# Patient Record
Sex: FEMALE | Race: WHITE | Employment: OTHER | URBAN - METROPOLITAN AREA
[De-identification: names, ages, dates, MRNs, and addresses within clinical notes are randomized per-mention and may not be internally consistent; named-entity substitution may affect disease eponyms.]

---

## 2020-12-17 ENCOUNTER — HOSPITAL ENCOUNTER (EMERGENCY)
Facility: CLINIC | Age: 65
Discharge: HOME | End: 2020-12-17
Attending: EMERGENCY MEDICINE
Payer: COMMERCIAL

## 2020-12-17 ENCOUNTER — APPOINTMENT (OUTPATIENT)
Dept: GENERAL RADIOLOGY | Facility: CLINIC | Age: 65
End: 2020-12-17
Payer: COMMERCIAL

## 2020-12-17 VITALS
OXYGEN SATURATION: 98 % | DIASTOLIC BLOOD PRESSURE: 98 MMHG | WEIGHT: 200 LBS | BODY MASS INDEX: 35.44 KG/M2 | SYSTOLIC BLOOD PRESSURE: 164 MMHG | HEIGHT: 63 IN | TEMPERATURE: 98.5 F | HEART RATE: 64 BPM | RESPIRATION RATE: 20 BRPM

## 2020-12-17 LAB
ABSOLUTE EOS #: 0.1 K/UL (ref 0–0.4)
ABSOLUTE IMMATURE GRANULOCYTE: ABNORMAL K/UL (ref 0–0.3)
ABSOLUTE LYMPH #: 1.4 K/UL (ref 1–4.8)
ABSOLUTE MONO #: 0.6 K/UL (ref 0.1–1.2)
ANION GAP SERPL CALCULATED.3IONS-SCNC: 9 MMOL/L (ref 9–17)
BASOPHILS # BLD: 1 % (ref 0–2)
BASOPHILS ABSOLUTE: 0 K/UL (ref 0–0.2)
BNP INTERPRETATION: NORMAL
BUN BLDV-MCNC: 14 MG/DL (ref 8–23)
BUN/CREAT BLD: ABNORMAL (ref 9–20)
CALCIUM SERPL-MCNC: 9 MG/DL (ref 8.6–10.4)
CHLORIDE BLD-SCNC: 104 MMOL/L (ref 98–107)
CO2: 25 MMOL/L (ref 20–31)
CREAT SERPL-MCNC: 0.7 MG/DL (ref 0.5–0.9)
D-DIMER QUANTITATIVE: 0.31 MG/L FEU
DIFFERENTIAL TYPE: ABNORMAL
EOSINOPHILS RELATIVE PERCENT: 1 % (ref 1–4)
GFR AFRICAN AMERICAN: >60 ML/MIN
GFR NON-AFRICAN AMERICAN: >60 ML/MIN
GFR SERPL CREATININE-BSD FRML MDRD: ABNORMAL ML/MIN/{1.73_M2}
GFR SERPL CREATININE-BSD FRML MDRD: ABNORMAL ML/MIN/{1.73_M2}
GLUCOSE BLD-MCNC: 102 MG/DL (ref 70–99)
HCT VFR BLD CALC: 43.7 % (ref 36–46)
HEMOGLOBIN: 14.4 G/DL (ref 12–16)
IMMATURE GRANULOCYTES: ABNORMAL %
INR BLD: 1
LYMPHOCYTES # BLD: 26 % (ref 24–44)
MCH RBC QN AUTO: 30 PG (ref 26–34)
MCHC RBC AUTO-ENTMCNC: 33 G/DL (ref 31–37)
MCV RBC AUTO: 91 FL (ref 80–100)
MONOCYTES # BLD: 12 % (ref 2–11)
NRBC AUTOMATED: ABNORMAL PER 100 WBC
PARTIAL THROMBOPLASTIN TIME: 23.9 SEC (ref 21.3–31.3)
PDW BLD-RTO: 13.2 % (ref 12.5–15.4)
PLATELET # BLD: 185 K/UL (ref 140–450)
PLATELET ESTIMATE: ABNORMAL
PMV BLD AUTO: 9.2 FL (ref 6–12)
POTASSIUM SERPL-SCNC: 4.2 MMOL/L (ref 3.7–5.3)
PRO-BNP: 156 PG/ML
PROTHROMBIN TIME: 9.9 SEC (ref 9.4–12.6)
RBC # BLD: 4.8 M/UL (ref 4–5.2)
RBC # BLD: ABNORMAL 10*6/UL
SEG NEUTROPHILS: 60 % (ref 36–66)
SEGMENTED NEUTROPHILS ABSOLUTE COUNT: 3.3 K/UL (ref 1.8–7.7)
SODIUM BLD-SCNC: 138 MMOL/L (ref 135–144)
TROPONIN INTERP: NORMAL
TROPONIN T: NORMAL NG/ML
TROPONIN, HIGH SENSITIVITY: <6 NG/L (ref 0–14)
WBC # BLD: 5.4 K/UL (ref 3.5–11)
WBC # BLD: ABNORMAL 10*3/UL

## 2020-12-17 PROCEDURE — 93005 ELECTROCARDIOGRAM TRACING: CPT | Performed by: EMERGENCY MEDICINE

## 2020-12-17 PROCEDURE — 85610 PROTHROMBIN TIME: CPT

## 2020-12-17 PROCEDURE — 36415 COLL VENOUS BLD VENIPUNCTURE: CPT

## 2020-12-17 PROCEDURE — 80048 BASIC METABOLIC PNL TOTAL CA: CPT

## 2020-12-17 PROCEDURE — 85730 THROMBOPLASTIN TIME PARTIAL: CPT

## 2020-12-17 PROCEDURE — 71045 X-RAY EXAM CHEST 1 VIEW: CPT

## 2020-12-17 PROCEDURE — 85025 COMPLETE CBC W/AUTO DIFF WBC: CPT

## 2020-12-17 PROCEDURE — 99284 EMERGENCY DEPT VISIT MOD MDM: CPT

## 2020-12-17 PROCEDURE — 84484 ASSAY OF TROPONIN QUANT: CPT

## 2020-12-17 PROCEDURE — 85379 FIBRIN DEGRADATION QUANT: CPT

## 2020-12-17 PROCEDURE — 83880 ASSAY OF NATRIURETIC PEPTIDE: CPT

## 2020-12-17 RX ORDER — AMLODIPINE BESYLATE 5 MG/1
5 TABLET ORAL DAILY
COMMUNITY

## 2020-12-17 RX ORDER — CARVEDILOL 6.25 MG/1
6.25 TABLET ORAL 2 TIMES DAILY WITH MEALS
COMMUNITY

## 2020-12-17 ASSESSMENT — PAIN SCALES - GENERAL
PAINLEVEL_OUTOF10: 3
PAINLEVEL_OUTOF10: 8

## 2020-12-17 ASSESSMENT — PAIN DESCRIPTION - FREQUENCY: FREQUENCY: CONTINUOUS

## 2020-12-17 ASSESSMENT — PAIN DESCRIPTION - PROGRESSION: CLINICAL_PROGRESSION: NOT CHANGED

## 2020-12-17 ASSESSMENT — PAIN DESCRIPTION - PAIN TYPE: TYPE: ACUTE PAIN

## 2020-12-17 ASSESSMENT — PAIN DESCRIPTION - DESCRIPTORS: DESCRIPTORS: PRESSURE

## 2020-12-17 ASSESSMENT — PAIN DESCRIPTION - ONSET: ONSET: GRADUAL

## 2020-12-17 ASSESSMENT — PAIN DESCRIPTION - LOCATION: LOCATION: CHEST

## 2020-12-17 NOTE — ED NOTES
Pt states friend tested positive for covid Tuesday and pt has similar symptoms, had covid test done and negative, called pmd and is planning on having repeat covid test tomorrow. Pt c/o back pain bodyaches, hurts to breath, chest pain.  resp nonlabored,pt able to speak full sent w/o diff     [address], RN  12/17/20 [id number]

## 2020-12-17 NOTE — ED PROVIDER NOTES
[address] [facility]      Pt Name: Shavonne Herrmann  MRN: [id number]  [place] 1955  Date of evaluation: 12/17/2020      CHIEF COMPLAINT       Chief Complaint   Patient presents with    Chest Pain     Pressure, 8/10. Tested for Covid on 12/15 and was negative. Has had a cough and chest pain. Taking Albuterol and Tessalon Pearls for two days.  Back Pain    Shortness of Breath    Cough         HISTORY OF PRESENT ILLNESS      The patient presents with chest discomfort for the past 3 days. She got a test for Covid 2 days ago and it was negative. The patient says she is having tightness that is in her front of her chest and toward her back. She has been using albuterol and Tessalon Perles for the past couple days. She reports a cough that is occasionally productive. She has not had a fever. She denies nausea, vomiting, or diarrhea. Has not noted leg pain or swelling. She does not have a history of blood clots. Nothing makes her symptoms better or worse otherwise. REVIEW OF SYSTEMS       All systems reviewed and negative unless noted in HPI. The patient denies fever or constitutional symptoms. Denies vision change. Denies any sore throat or rhinorrhea. Denies any neck pain or stiffness. Recent cough with tightness in chest and into back. No nausea,  vomiting or diarrhea. Denies any dysuria. Denies urinary frequency or hematuria. Denies musculoskeletal injury or pain. Denies any weakness, numbness or focal neurologic deficit. Denies any skin rash or edema. No recent psychiatric issues. No easy bruising or bleeding. Denies any polyuria, polydypsia or history of immunocompromise. PAST MEDICAL HISTORY    has a past medical history of Anxiety, CAD (coronary artery disease), Hyperlipidemia, and Hypertension. SURGICAL HISTORY      has no past surgical history on file.     CURRENT MEDICATIONS Previous Medications    AMLODIPINE (NORVASC) 5 MG TABLET    Take 5 mg by mouth daily    CARVEDILOL (COREG) 6.25 MG TABLET    Take 6.25 mg by mouth 2 times daily (with meals)    CITALOPRAM HYDROBROMIDE (CELEXA PO)    Take by mouth    LOSARTAN POTASSIUM PO    Take by mouth    ROSUVASTATIN CALCIUM PO    Take 40 mg by mouth       ALLERGIES     has No Known Allergies. FAMILY HISTORY     has no family status information on file. family history is not on file. SOCIAL HISTORY      reports that she has never smoked. She has never used smokeless tobacco. She reports current alcohol use of about 7.0 standard drinks of alcohol per week. She reports that she does not use drugs. PHYSICAL EXAM     INITIAL VITALS:  height is 5' 3\" (1.6 m) and weight is 90.7 kg (200 lb). Her oral temperature is 98.5 °F (36.9 °C). Her blood pressure is 164/98 (abnormal) and her pulse is 68. Her respiration is 20 and oxygen saturation is 94%. The patient is alert and oriented, in no apparent distress. HEENT is atraumatic. Pupils are PERRL at 4 mm. Mucous membranes moist.    Neck is supple with no lymphadenopathy. No JVD. No meningismus. Heart sounds regular rate and rhythm with no gallops, murmurs, or rubs. Lungs clear, no wheezes, rales or rhonchi. Abdomen: soft, nontender with no pain to palpation. No pulsatile mass. Normal bowel sounds are noted. No rebound or guarding. Musculoskeletal exam shows no evidence of trauma. No pain to palpation in the calves or thighs. Normal distal pulses in all extremities. Skin: no rash or edema. Neurological exam reveals cranial nerves 2 through 12 grossly intact. Patient has equal  and normal deep tendon reflexes. Psychiatric: no hallucinations or suicidal ideation. Lymphatics.:  No lymphadenopathy.          DIFFERENTIAL DIAGNOSIS/ MDM:     URI, pneumonia, COVID-19, ACS, PE    DIAGNOSTIC RESULTS EKG: All EKG's are interpreted by the Emergency Department Physician who either signs or Co-signs this chart in the absence of a cardiologist.    Sinus 65 with no ischemia. Axis 15, , QRS 78, . No old to compare. RADIOLOGY:   I reviewed the radiologist interpretations:  XR CHEST PORTABLE   Final Result   No acute cardiopulmonary abnormality. XR CHEST PORTABLE (Final result)  Result time 12/17/20 15:27:59  Final result by Chloé Hernandez MD (12/17/20 15:27:59)                Impression:    No acute cardiopulmonary abnormality. Narrative:    EXAMINATION:   ONE XRAY VIEW OF THE CHEST     12/17/2020 3:19 pm     COMPARISON:   None. HISTORY:   ORDERING SYSTEM PROVIDED HISTORY: chest pain   TECHNOLOGIST PROVIDED HISTORY:   chest pain   Reason for Exam: Pt c/o cough and chest tightness that radiates to back   Acuity: Acute   Type of Exam: Initial     FINDINGS:   The lungs are clear.  The cardiac and mediastinal contours are normal.  There   is no pleural effusion or pneumothorax.  No acute osseous abnormality is   identified.                     LABS:  Results for orders placed or performed during the hospital encounter of 12/17/20   CBC Auto Differential   Result Value Ref Range    WBC 5.4 3.5 - 11.0 k/uL    RBC 4.80 4.0 - 5.2 m/uL    Hemoglobin 14.4 12.0 - 16.0 g/dL    Hematocrit 43.7 36 - 46 %    MCV 91.0 80 - 100 fL    MCH 30.0 26 - 34 pg    MCHC 33.0 31 - 37 g/dL    RDW 13.2 12.5 - 15.4 %    Platelets 031 405 - 219 k/uL    MPV 9.2 6.0 - 12.0 fL    NRBC Automated NOT REPORTED per 100 WBC    Differential Type NOT REPORTED     Seg Neutrophils 60 36 - 66 %    Lymphocytes 26 24 - 44 %    Monocytes 12 (H) 2 - 11 %    Eosinophils % 1 1 - 4 %    Basophils 1 0 - 2 %    Immature Granulocytes NOT REPORTED 0 %    Segs Absolute 3.30 1.8 - 7.7 k/uL    Absolute Lymph # 1.40 1.0 - 4.8 k/uL    Absolute Mono # 0.60 0.1 - 1.2 k/uL    Absolute Eos # 0.10 0.0 - 0.4 k/uL Basophils Absolute 0.00 0.0 - 0.2 k/uL    Absolute Immature Granulocyte NOT REPORTED 0.00 - 0.30 k/uL    WBC Morphology NOT REPORTED     RBC Morphology NOT REPORTED     Platelet Estimate NOT REPORTED    Basic Metabolic Panel   Result Value Ref Range    Glucose 102 (H) 70 - 99 mg/dL    BUN 14 8 - 23 mg/dL    CREATININE 0.70 0.50 - 0.90 mg/dL    Bun/Cre Ratio NOT REPORTED 9 - 20    Calcium 9.0 8.6 - 10.4 mg/dL    Sodium 138 135 - 144 mmol/L    Potassium 4.2 3.7 - 5.3 mmol/L    Chloride 104 98 - 107 mmol/L    CO2 25 20 - 31 mmol/L    Anion Gap 9 9 - 17 mmol/L    GFR Non-African American >60 >60 mL/min    GFR African American >60 >60 mL/min    GFR Comment          GFR Staging NOT REPORTED    Brain Natriuretic Peptide   Result Value Ref Range    Pro- <300 pg/mL    BNP Interpretation Pro-BNP Reference Range:    Troponin   Result Value Ref Range    Troponin, High Sensitivity <6 0 - 14 ng/L    Troponin T NOT REPORTED <0.03 ng/mL    Troponin Interp NOT REPORTED    D-Dimer, Quantitative   Result Value Ref Range    D-Dimer, Quant 0.31 mg/L FEU   Protime-INR   Result Value Ref Range    Protime 9.9 9.4 - 12.6 sec    INR 1.0    APTT   Result Value Ref Range    PTT 23.9 21.3 - 31.3 sec         EMERGENCY DEPARTMENT COURSE:   Vitals:    Vitals:    12/17/20 1411 12/17/20 1500   BP: (!) 161/91 (!) 164/98   Pulse: 68 68   Resp:  20   Temp: 98.5 °F (36.9 °C)    TempSrc: Oral    SpO2:  94%   Weight: 90.7 kg (200 lb)    Height: 5' 3\" (1.6 m)      -------------------------  BP: (!) 164/98, Temp: 98.5 °F (36.9 °C), Pulse: 68, Resp: 20      Re-evaluation Notes    My index of suspicion for PE or ACS is low, especially given the duration of discomfort. My index of suspicion for dissection is also low. The patient is asked to follow-up with her doctor. Given that she is having some URI symptoms, she should isolate at home. The patient is discharged in good condition. FINAL IMPRESSION      1.  Chest pain, unspecified type DISPOSITION/PLAN   DISPOSITION        Condition on Disposition    good    PATIENT REFERRED TO:  Pavithra Loo MD  [address] [phone]    In 1 week        DISCHARGE MEDICATIONS:  New Prescriptions    No medications on file       (Please note that portions of this note were completed with a voice recognition program.  Efforts were made to edit the dictations but occasionally words are mis-transcribed.)    [place] MD   Attending Emergency Physician       Ann Longoria MD  12/17/20 [id number]

## 2020-12-18 LAB
EKG ATRIAL RATE: 65 BPM
EKG P AXIS: 57 DEGREES
EKG P-R INTERVAL: 176 MS
EKG Q-T INTERVAL: 406 MS
EKG QRS DURATION: 78 MS
EKG QTC CALCULATION (BAZETT): 422 MS
EKG R AXIS: 15 DEGREES
EKG T AXIS: 39 DEGREES
EKG VENTRICULAR RATE: 65 BPM

## 2023-08-01 ENCOUNTER — TELEPHONE (OUTPATIENT)
Dept: SURGERY | Age: 68
End: 2023-08-01

## 2023-08-01 NOTE — TELEPHONE ENCOUNTER
Dr. Kang Needs  Screening Colonoscopy  9-19-23   1PM   [facility]      Sent Pre-Op and Bowel Prep Instructions to patient. Updated Pharmacy Information. Routed Bowel Prep Meds to Dr. Amna Urrutia.

## 2023-09-12 ENCOUNTER — TELEPHONE (OUTPATIENT)
Dept: SURGERY | Age: 68
End: 2023-09-12

## 2023-09-12 RX ORDER — SOD SULF/POT CHLORIDE/MAG SULF 1.479 G
TABLET ORAL
Qty: 24 TABLET | Refills: 0 | Status: SHIPPED | OUTPATIENT
Start: 2023-09-12

## 2023-09-14 ENCOUNTER — TELEPHONE (OUTPATIENT)
Dept: SURGERY | Age: 68
End: 2023-09-14

## 2023-09-14 NOTE — TELEPHONE ENCOUNTER
Patient called and stated that her SuTab prescription was too expensive. [facility] with Copay card.  Called and informed pt of $60 copay and that it would be ready on 9/15/23 after 4pm.

## 2023-09-19 ENCOUNTER — ANESTHESIA EVENT (OUTPATIENT)
Dept: OPERATING ROOM | Age: 68
End: 2023-09-19
Payer: MEDICARE

## 2023-09-19 ENCOUNTER — HOSPITAL ENCOUNTER (OUTPATIENT)
Age: 68
Setting detail: OUTPATIENT SURGERY
Discharge: HOME | End: 2023-09-19
Attending: COLON & RECTAL SURGERY | Admitting: COLON & RECTAL SURGERY
Payer: MEDICARE

## 2023-09-19 ENCOUNTER — ANESTHESIA (OUTPATIENT)
Dept: OPERATING ROOM | Age: 68
End: 2023-09-19
Payer: MEDICARE

## 2023-09-19 VITALS
SYSTOLIC BLOOD PRESSURE: 161 MMHG | TEMPERATURE: 97 F | WEIGHT: 182.8 LBS | HEIGHT: 63 IN | HEART RATE: 79 BPM | BODY MASS INDEX: 32.39 KG/M2 | RESPIRATION RATE: 17 BRPM | OXYGEN SATURATION: 100 % | DIASTOLIC BLOOD PRESSURE: 85 MMHG

## 2023-09-19 DIAGNOSIS — Z12.11 SCREENING FOR COLON CANCER: ICD-10-CM

## 2023-09-19 LAB — GLUCOSE BLD-MCNC: 87 MG/DL (ref 65–105)

## 2023-09-19 PROCEDURE — 3609010300 HC COLONOSCOPY W/BIOPSY SINGLE/MULTIPLE: Performed by: COLON & RECTAL SURGERY

## 2023-09-19 PROCEDURE — 2709999900 HC NON-CHARGEABLE SUPPLY: Performed by: COLON & RECTAL SURGERY

## 2023-09-19 PROCEDURE — 6360000002 HC RX W HCPCS: Performed by: SPECIALIST

## 2023-09-19 PROCEDURE — 82947 ASSAY GLUCOSE BLOOD QUANT: CPT

## 2023-09-19 PROCEDURE — 3700000000 HC ANESTHESIA ATTENDED CARE: Performed by: COLON & RECTAL SURGERY

## 2023-09-19 PROCEDURE — 3700000001 HC ADD 15 MINUTES (ANESTHESIA): Performed by: COLON & RECTAL SURGERY

## 2023-09-19 PROCEDURE — 2500000003 HC RX 250 WO HCPCS: Performed by: SPECIALIST

## 2023-09-19 PROCEDURE — 7100000010 HC PHASE II RECOVERY - FIRST 15 MIN: Performed by: COLON & RECTAL SURGERY

## 2023-09-19 PROCEDURE — 2580000003 HC RX 258

## 2023-09-19 PROCEDURE — 7100000011 HC PHASE II RECOVERY - ADDTL 15 MIN: Performed by: COLON & RECTAL SURGERY

## 2023-09-19 PROCEDURE — 88305 TISSUE EXAM BY PATHOLOGIST: CPT

## 2023-09-19 RX ORDER — SEMAGLUTIDE 0.68 MG/ML
INJECTION, SOLUTION SUBCUTANEOUS
COMMUNITY
Start: 2023-09-07

## 2023-09-19 RX ORDER — ROSUVASTATIN CALCIUM 40 MG/1
40 TABLET, COATED ORAL DAILY
COMMUNITY
Start: 2023-09-07

## 2023-09-19 RX ORDER — SODIUM CHLORIDE 0.9 % (FLUSH) 0.9 %
5-40 SYRINGE (ML) INJECTION EVERY 12 HOURS SCHEDULED
Status: DISCONTINUED | OUTPATIENT
Start: 2023-09-19 | End: 2023-09-19 | Stop reason: HOSPADM

## 2023-09-19 RX ORDER — OMEPRAZOLE 20 MG/1
20 CAPSULE, DELAYED RELEASE ORAL
COMMUNITY
Start: 2023-06-22

## 2023-09-19 RX ORDER — SODIUM CHLORIDE 9 MG/ML
INJECTION, SOLUTION INTRAVENOUS PRN
Status: DISCONTINUED | OUTPATIENT
Start: 2023-09-19 | End: 2023-09-19 | Stop reason: HOSPADM

## 2023-09-19 RX ORDER — LIDOCAINE HYDROCHLORIDE 20 MG/ML
INJECTION, SOLUTION EPIDURAL; INFILTRATION; INTRACAUDAL; PERINEURAL PRN
Status: DISCONTINUED | OUTPATIENT
Start: 2023-09-19 | End: 2023-09-19

## 2023-09-19 RX ORDER — SODIUM CHLORIDE 9 MG/ML
INJECTION, SOLUTION INTRAVENOUS CONTINUOUS
Status: DISCONTINUED | OUTPATIENT
Start: 2023-09-19 | End: 2023-09-19 | Stop reason: HOSPADM

## 2023-09-19 RX ORDER — SODIUM CHLORIDE 9 MG/ML
INJECTION, SOLUTION INTRAVENOUS PRN
Status: CANCELLED | OUTPATIENT
Start: 2023-09-19

## 2023-09-19 RX ORDER — ONDANSETRON 2 MG/ML
4 INJECTION INTRAMUSCULAR; INTRAVENOUS
Status: CANCELLED | OUTPATIENT
Start: 2023-09-19 | End: 2023-09-20

## 2023-09-19 RX ORDER — FENTANYL CITRATE 50 UG/ML
25 INJECTION, SOLUTION INTRAMUSCULAR; INTRAVENOUS EVERY 5 MIN PRN
Status: CANCELLED | OUTPATIENT
Start: 2023-09-19

## 2023-09-19 RX ORDER — SODIUM CHLORIDE 0.9 % (FLUSH) 0.9 %
5-40 SYRINGE (ML) INJECTION PRN
Status: DISCONTINUED | OUTPATIENT
Start: 2023-09-19 | End: 2023-09-19 | Stop reason: HOSPADM

## 2023-09-19 RX ORDER — SODIUM CHLORIDE, SODIUM LACTATE, POTASSIUM CHLORIDE, CALCIUM CHLORIDE 600; 310; 30; 20 MG/100ML; MG/100ML; MG/100ML; MG/100ML
INJECTION, SOLUTION INTRAVENOUS CONTINUOUS
Status: DISCONTINUED | OUTPATIENT
Start: 2023-09-19 | End: 2023-09-19 | Stop reason: HOSPADM

## 2023-09-19 RX ORDER — LORATADINE 10 MG/1
10 TABLET ORAL DAILY
COMMUNITY
Start: 2023-08-17

## 2023-09-19 RX ORDER — LIDOCAINE HYDROCHLORIDE 10 MG/ML
1 INJECTION, SOLUTION EPIDURAL; INFILTRATION; INTRACAUDAL; PERINEURAL
Status: DISCONTINUED | OUTPATIENT
Start: 2023-09-20 | End: 2023-09-19 | Stop reason: HOSPADM

## 2023-09-19 RX ORDER — ESCITALOPRAM OXALATE 10 MG/1
10 TABLET ORAL DAILY
COMMUNITY
Start: 2023-09-07

## 2023-09-19 RX ORDER — SODIUM CHLORIDE 0.9 % (FLUSH) 0.9 %
5-40 SYRINGE (ML) INJECTION EVERY 12 HOURS SCHEDULED
Status: CANCELLED | OUTPATIENT
Start: 2023-09-19

## 2023-09-19 RX ORDER — PROPOFOL 10 MG/ML
INJECTION, EMULSION INTRAVENOUS PRN
Status: DISCONTINUED | OUTPATIENT
Start: 2023-09-19 | End: 2023-09-19 | Stop reason: SDUPTHER

## 2023-09-19 RX ORDER — LIDOCAINE HYDROCHLORIDE 10 MG/ML
INJECTION, SOLUTION EPIDURAL; INFILTRATION; INTRACAUDAL; PERINEURAL PRN
Status: DISCONTINUED | OUTPATIENT
Start: 2023-09-19 | End: 2023-09-19 | Stop reason: SDUPTHER

## 2023-09-19 RX ORDER — HYDROMORPHONE HYDROCHLORIDE 1 MG/ML
0.5 INJECTION, SOLUTION INTRAMUSCULAR; INTRAVENOUS; SUBCUTANEOUS EVERY 5 MIN PRN
Status: CANCELLED | OUTPATIENT
Start: 2023-09-19

## 2023-09-19 RX ORDER — SODIUM CHLORIDE 0.9 % (FLUSH) 0.9 %
5-40 SYRINGE (ML) INJECTION PRN
Status: CANCELLED | OUTPATIENT
Start: 2023-09-19

## 2023-09-19 RX ADMIN — SODIUM CHLORIDE, POTASSIUM CHLORIDE, SODIUM LACTATE AND CALCIUM CHLORIDE: 600; 310; 30; 20 INJECTION, SOLUTION INTRAVENOUS at 12:22

## 2023-09-19 RX ADMIN — PROPOFOL 100 MG: 10 INJECTION, EMULSION INTRAVENOUS at 14:10

## 2023-09-19 RX ADMIN — LIDOCAINE HYDROCHLORIDE 50 MG: 10 INJECTION, SOLUTION EPIDURAL; INFILTRATION; INTRACAUDAL; PERINEURAL at 13:55

## 2023-09-19 RX ADMIN — PROPOFOL 100 MG: 10 INJECTION, EMULSION INTRAVENOUS at 13:58

## 2023-09-19 RX ADMIN — PROPOFOL 100 MG: 10 INJECTION, EMULSION INTRAVENOUS at 14:04

## 2023-09-19 RX ADMIN — PROPOFOL 70 MG: 10 INJECTION, EMULSION INTRAVENOUS at 13:55

## 2023-09-19 ASSESSMENT — PAIN - FUNCTIONAL ASSESSMENT: PAIN_FUNCTIONAL_ASSESSMENT: 0-10

## 2023-09-19 NOTE — H&P
atraumatic. Eye: no icterus, redness, pupils equal and reactive, conjunctiva clear  Ear: normal external ear, no discharge, hearing intact  Nose:  no drainage noted  Mouth: mucous membranes moist  Neck: supple, scar consistent with right CEA, no carotid bruits, thyroid not palpable  Lungs: Bilateral equal air entry with normal effort, unlabored at rest w/o use of accessory muscles or wheezing, rales or rhonchi, normal effort  No CVA tenderness   Cardiovascular: HR 74 normal rate, regular rhythm, no murmur, gallop, rub. Abdomen: Soft, nontender, nondistended, normal bowel sounds  Neurologic: normal muscle tone and bulk, no abnormal sensation, normal speech, cranial nerves II through XII grossly intact  Skin: No gross visible lesions, rashes, bruising or bleeding on exposed skin area  Extremities:  peripheral pulses palpable, no pedal edema or calf pain with palpation  Psych: normal affect     Investigations:      Laboratory Testing:  Recent Results (from the past 24 hour(s))   POC Glucose Fingerstick    Collection Time: 09/19/23 12:19 PM   Result Value Ref Range    POC Glucose 87 65 - 105 mg/dL       No results for input(s): \"HGB\", \"HCT\", \"WBC\", \"MCV\", \"PLATELET\", \"NA\", \"K\", \"CL\", \"CO2\", \"BUN\", \"CREATININE\", \"GLUCOSE\", \"INR\", \"PROTIME\", \"APTT\", \"AST\", \"ALT\", \"LABALBU\", \"HCG\" in the last 720 hours. Imaging/Diagnostics:    Diagnosis:      Screening for colon cancer    Plans:     1.  COLORECTAL CANCER SCREENING, NOT HIGH RISK        SerafinYANNICK Hendrickson - CNP  9/19/2023  12:31 PM

## 2023-09-19 NOTE — OP NOTE
Colonoscopy Operative Report    Pre-operative Diagnosis: Family history of colon cancer    Post-operative Diagnosis: Colon polyps    Procedure: Colonoscopy with cold forceps polypectomy    Surgeon: WAYNE Gregory  Assistant: RN/ Technician    IV Medications: Monitored anesthesia administered by anesthesiologist    Complications: None    Estimated blood loss: None    Bowel Prep Quality:    [x]  Good    []  Some fecal material but acceptable    []  Unacceptable      Findings:      []  Normal Colon    []  Polyps  #1, 3 mm in size, located at 7 cm  removed by cold biopsy and sent for pathology  #2,  3 mm in size, located in at 20 cm removed by cold biopsy and sent for pathology    []  Diverticulosis noted at     []  Hemorrhoids:     [] Mass/ Tumor    [x] Other:Abnormal fold noted at 75 cm a biopsy of which was taken using cold forceps    Specimens:   ID Type Source Tests Collected by Time Destination   A : BIOPSY OF ASCENDING COLON ABNORMAL FOLD AT 75 CM Tissue Colon-Ascending SURGICAL PATHOLOGY Shirley Skelton MD 9/19/2023 1405    B : POLYP AT 70 CM Tissue Colon SURGICAL PATHOLOGY Shirley Skelton MD 9/19/2023 1408    C :  POLYP AT 25 CM   Tissue Colon SURGICAL PATHOLOGY Shirley Skelton MD 9/19/2023 1412        Implants: None    Drains: None    Details: The patient was placed in left lateral position on the operating table. After the initiation of intravenous sedatives by the anesthesiologist, digital rectal exam was done, and a flexible pediatric colonoscope of variable stiffness was inserted per rectum and advanced all the way to cecum under direct vision. The cecum was recognized by the ileocecal valve and appendiceal orifice. The colonoscope was withdrawn, and the ascending, transverse, descending, and sigmoid segments of the colon as well as the rectum were carefully examined. The tip of the colonoscope was retroflexed in the rectum to inspect the anorectal segment.    Findings listed above were noted, and

## 2023-09-19 NOTE — DISCHARGE INSTRUCTIONS
Colonoscopy / Sigmoidoscopy Discharge Instructions  Ford Hernández M.D. You may have a regular diet unless instructed otherwise and if you do not have any abdominal (belly) pains, nausea, vomiting or fever. No driving, operating machinery or making important decisions for the next 24 hours. It is normal to feel distended or full in the abdomen; passing gas will help relieve the fullness. The fullness is from the air put inside your bowel (colon) during the procedure. It is normal to pass small amounts of blood after the colonoscopy. Findings today included:  Polyps 2 in number removed / biopsied  Other: Abnormal fold which was biopsied. Medications:   No changes to home medication therapy. Avoid pain medications other than Tylenol for 7 days. We will call you within a week with results of pathology tests if any tissue was removed. Schedule a follow up visit as needed. Below are abnormal symptoms which will need medical attention; please call the office at any time if these occur. Large amount of bleeding through the rectum   Severe abdominal pain or pain in the belly (it is normal to have occasional mild discomfort). Fever of 101F or above, chills or excessive sweating. Persistent nausea or vomiting.

## 2023-09-19 NOTE — ANESTHESIA POSTPROCEDURE EVALUATION
Department of Anesthesiology  Postprocedure Note    Patient: Eduardo Graft  MRN: [id number]  [address] [facility] [place]: 1955  Date of evaluation: 9/19/2023      Procedure Summary     Date: 09/19/23 Room / Location: [place] [address] / [facility] - INPATIENT    Anesthesia Start: 1350 Anesthesia Stop: 1430    Procedure: COLONOSCOPY WITH BIOPSY (Anus) Diagnosis:       Screening for colon cancer      (Screening for colon cancer [Z12.11])    Surgeons: Arian Laureano MD Responsible Provider: Tushar Mahajan MD    Anesthesia Type: MAC ASA Status: 2          Anesthesia Type: No value filed.     Ildefonso Phase I:      Ildefonso Phase II: Ildefonso Score: 10      Anesthesia Post Evaluation    Patient location during evaluation: PACU  Patient participation: complete - patient participated  Level of consciousness: awake and alert  Airway patency: patent  Nausea & Vomiting: no nausea and no vomiting  Complications: no  Cardiovascular status: hemodynamically stable  Respiratory status: acceptable  Hydration status: stable  Pain management: adequate

## 2023-09-21 LAB — SURGICAL PATHOLOGY REPORT: NORMAL

## 2023-09-26 ENCOUNTER — OFFICE VISIT (OUTPATIENT)
Age: 68
End: 2023-09-26
Payer: MEDICARE

## 2023-09-26 VITALS — BODY MASS INDEX: 32.25 KG/M2 | HEIGHT: 63 IN | WEIGHT: 182 LBS

## 2023-09-26 DIAGNOSIS — M25.561 RIGHT KNEE PAIN, UNSPECIFIED CHRONICITY: Primary | ICD-10-CM

## 2023-09-26 PROCEDURE — 1090F PRES/ABSN URINE INCON ASSESS: CPT | Performed by: ORTHOPAEDIC SURGERY

## 2023-09-26 PROCEDURE — G8400 PT W/DXA NO RESULTS DOC: HCPCS | Performed by: ORTHOPAEDIC SURGERY

## 2023-09-26 PROCEDURE — 99214 OFFICE O/P EST MOD 30 MIN: CPT | Performed by: ORTHOPAEDIC SURGERY

## 2023-09-26 PROCEDURE — 1123F ACP DISCUSS/DSCN MKR DOCD: CPT | Performed by: ORTHOPAEDIC SURGERY

## 2023-09-26 PROCEDURE — G8428 CUR MEDS NOT DOCUMENT: HCPCS | Performed by: ORTHOPAEDIC SURGERY

## 2023-09-26 PROCEDURE — G8417 CALC BMI ABV UP PARAM F/U: HCPCS | Performed by: ORTHOPAEDIC SURGERY

## 2023-09-26 PROCEDURE — 20610 DRAIN/INJ JOINT/BURSA W/O US: CPT | Performed by: ORTHOPAEDIC SURGERY

## 2023-09-26 PROCEDURE — 1036F TOBACCO NON-USER: CPT | Performed by: ORTHOPAEDIC SURGERY

## 2023-09-26 PROCEDURE — 3017F COLORECTAL CA SCREEN DOC REV: CPT | Performed by: ORTHOPAEDIC SURGERY

## 2023-09-26 RX ORDER — METHYLPREDNISOLONE ACETATE 80 MG/ML
80 INJECTION, SUSPENSION INTRA-ARTICULAR; INTRALESIONAL; INTRAMUSCULAR; SOFT TISSUE ONCE
Status: COMPLETED | OUTPATIENT
Start: 2023-09-26 | End: 2023-09-26

## 2023-09-26 RX ORDER — LIDOCAINE HYDROCHLORIDE 10 MG/ML
2 INJECTION, SOLUTION INFILTRATION; PERINEURAL ONCE
Status: COMPLETED | OUTPATIENT
Start: 2023-09-26 | End: 2023-09-26

## 2023-09-26 RX ADMIN — LIDOCAINE HYDROCHLORIDE 2 ML: 10 INJECTION, SOLUTION INFILTRATION; PERINEURAL at 11:41

## 2023-09-26 RX ADMIN — METHYLPREDNISOLONE ACETATE 80 MG: 80 INJECTION, SUSPENSION INTRA-ARTICULAR; INTRALESIONAL; INTRAMUSCULAR; SOFT TISSUE at 11:42

## 2023-09-26 NOTE — PROGRESS NOTES
[place]  [facility]  [address] [place] [address] [address]  [place] [address]  Dept: [phone]  Dept Fax: [phone]    Chief Compliant:  Chief Complaint   Patient presents with    Knee Pain     Right knee pain. History of Present Illness: This is a 80 y.o. female who presents to the clinic today for evaluation / follow up of right knee pain. She has had this pain for probably a year anyway but has become more significant in the last 3 weeks. She does state that she went swimming and she stepped on some uneven ground and while in the water and this may have aggravated her knee. Has pain over the medial aspect. She is here today for further evaluation. She is never any previous surgery for it she is tried ibuprofen and Tylenol without any significant relief. Physical Exam:    On examination today the right knee has no effusion but is tender palpation over the medial joint line there is no varus or valgus instability cap refill is in 2 seconds sensation intact light touch she has good knee extension strength skin is normal over top    Nursing note and vitals reviewed. XR taken today:  XR KNEE RIGHT (3 VIEWS)    Result Date: 9/26/2023  3 views of the right knee show still neutral alignment overall well-preserved joint space and some acute process no bony lesions. No significant osteophytes. Orders Placed This Encounter   Procedures    XR KNEE RIGHT (3 VIEWS)     Order Specific Question:   Reason for exam:     Answer:   right knee pain    IA DRAIN/INJECT LARGE JOINT/BURSA       Assessment and Plan:  1. Right knee pain, unspecified chronicity          This is a 80 y.o. female with right knee pain. I discussed with her that she could have some mild arthritis in the knee that was aggravated by walking [place] uneven [place].   Or potentially she could even

## 2024-02-05 ENCOUNTER — HOSPITAL ENCOUNTER (OUTPATIENT)
Dept: PREADMISSION TESTING | Age: 69
Discharge: HOME | End: 2024-02-09

## 2024-02-05 VITALS
BODY MASS INDEX: 32.25 KG/M2 | TEMPERATURE: 97.9 F | DIASTOLIC BLOOD PRESSURE: 79 MMHG | HEART RATE: 72 BPM | SYSTOLIC BLOOD PRESSURE: 165 MMHG | RESPIRATION RATE: 18 BRPM | OXYGEN SATURATION: 99 % | WEIGHT: 182 LBS | HEIGHT: 63 IN

## 2024-02-05 DIAGNOSIS — Z01.818 PRE-OP TESTING: Primary | ICD-10-CM

## 2024-02-05 LAB
EKG ATRIAL RATE: 62 BPM
EKG P AXIS: 40 DEGREES
EKG P-R INTERVAL: 176 MS
EKG Q-T INTERVAL: 412 MS
EKG QRS DURATION: 72 MS
EKG QTC CALCULATION (BAZETT): 418 MS
EKG R AXIS: 10 DEGREES
EKG T AXIS: 28 DEGREES
EKG VENTRICULAR RATE: 62 BPM
ERYTHROCYTE [DISTWIDTH] IN BLOOD BY AUTOMATED COUNT: 12.8 % (ref 11.8–14.4)
HCT VFR BLD AUTO: 43.5 % (ref 36.3–47.1)
HGB BLD-MCNC: 13.6 G/DL (ref 11.9–15.1)
MCH RBC QN AUTO: 29.8 PG (ref 25.2–33.5)
MCHC RBC AUTO-ENTMCNC: 31.3 G/DL (ref 28.4–34.8)
MCV RBC AUTO: 95.4 FL (ref 82.6–102.9)
NRBC BLD-RTO: 0 PER 100 WBC
PLATELET # BLD AUTO: 232 K/UL (ref 138–453)
PMV BLD AUTO: 12.4 FL (ref 8.1–13.5)
RBC # BLD AUTO: 4.56 M/UL (ref 3.95–5.11)
WBC OTHER # BLD: 8.7 K/UL (ref 3.5–11.3)

## 2024-02-05 PROCEDURE — 80048 BASIC METABOLIC PNL TOTAL CA: CPT

## 2024-02-05 PROCEDURE — 93005 ELECTROCARDIOGRAM TRACING: CPT

## 2024-02-05 PROCEDURE — 85027 COMPLETE CBC AUTOMATED: CPT

## 2024-02-05 PROCEDURE — 36415 COLL VENOUS BLD VENIPUNCTURE: CPT

## 2024-02-05 RX ORDER — VALSARTAN 320 MG/1
320 TABLET ORAL DAILY
COMMUNITY

## 2024-02-05 RX ORDER — SIMVASTATIN 5 MG
5 TABLET ORAL NIGHTLY
COMMUNITY

## 2024-02-05 RX ORDER — SPIRONOLACTONE 50 MG/1
50 TABLET, FILM COATED ORAL DAILY
COMMUNITY

## 2024-02-05 NOTE — DISCHARGE INSTRUCTIONS
DAY OF SURGERY/PROCEDURE  GUIDELINES    As a patient at the [facility], you can expect quality medical and nursing care that is centered on your individual needs. It is our goal to make your surgical experience as comfortable and excellent as possible.  ________________________________________________________________________    The following instructions are general guidelines, if any information on this sheet is different from what your doctor has instructed you to do, please follow your doctor's instructions.    Please arrive on 2/14 @ 915 am       Enter through entrance C. Check in at registration     Upon arrival you will be taken to the pre-operative area to get ready for surgery, your family will stay in the waiting room and visit with you once you are ready for surgery. Due to special limitations please limit visitation to 1-2 members of your family at a time. When it is time for surgery your family will return to the waiting room.    Nothing to eat, drink, smoke, suck or chew after midnight (no water, gum, mints, cigarettes, cigars, pipes, snuff, chewing tobacco, etc.) or your surgery may be canceled.     Take a shower or bath on the morning of your surgery/procedure (Hibiclens if directed) Do not apply any lotions.    Brush your teeth, but do not swallow any water    IN CASE OF ILLNESS - If you have a cold or flu symptoms (high fever, runny nose, sore throat, cough, etc.) rash, nausea, vomiting, loose stools, and/or recent contact with someone who has a contagious disease (chick pox, measles, etc.) please call your doctor before coming to the surgery center    Take a small sip of water with heart, blood pressure, and/or seizure medication the morning of surgery. carvedilol    DO NOT take anticoagulants (blood thinners, aspirin or aspirin-containing products) as instructed by your physician.    Leave all jewelry at home and wear loose, comfortable clothing that is easy to put on and

## 2024-02-06 LAB
ANION GAP SERPL CALCULATED.3IONS-SCNC: 9 MMOL/L (ref 9–17)
BUN SERPL-MCNC: 17 MG/DL (ref 8–23)
CALCIUM SERPL-MCNC: 9.3 MG/DL (ref 8.6–10.4)
CHLORIDE SERPL-SCNC: 107 MMOL/L (ref 98–107)
CO2 SERPL-SCNC: 28 MMOL/L (ref 20–31)
CREAT SERPL-MCNC: 0.8 MG/DL (ref 0.5–0.9)
GFR SERPL CREATININE-BSD FRML MDRD: >60 ML/MIN/1.73M2
GLUCOSE SERPL-MCNC: 88 MG/DL (ref 70–99)
POTASSIUM SERPL-SCNC: 4.6 MMOL/L (ref 3.7–5.3)
SODIUM SERPL-SCNC: 144 MMOL/L (ref 135–144)

## 2024-02-07 NOTE — DISCHARGE INSTRUCTIONS
Activity  You have had anesthesia today  Do not drive, operate heavy equipment, consume alcoholic beverages, or make any important decisions  for 24 hours   If you are taking pain medication: Do not drive or consume alcohol.  Take your time changing positions today. You may feel light headed or dizzy if you move too quickly.   Continue your home medications as ordered by your physician.  Diet   You can eat your normal diet when you feel well. You should start off with bland foods like chicken soup, toast, or yogurt. Then advance as tolerated.  Drink plenty of fluids (unless your doctor tells you not to). Your urine should be very lightly colored without a strong odor.   Activity   You have had anesthesia today  Do not drive, operate heavy equipment, consume alcoholic beverages, or make any important decisions  for 24 hours   If you are taking pain medication: Do not drive or consume alcohol.  Take your time changing positions today. You may feel light headed or dizzy if you move too quickly.   Continue your home medications as ordered by your physician.  Avoid aspirin and over the counter medications (including vitamins, and herbal supplements) for 3 days, unless otherwise instructed by your physician.    Rest for the next 24 hours. Getting enough rest will help you recover.   No  heavy lifting or straining right after surgery.   No  strenuous activities as your doctor or other health care professional recommends. Examples of these might include weightlifting, running, or playing sports.   *** You may sponge bathe/shower, keeping bandage clean and dry, after 48 hours  *** Elevate the extremity    *** Use Incentive Spirometer as directed, at least 10 times an hour, when awake  *** Sleep with the head of the bed elevated or sleep in a recliner  *** Wear your support garment at all times.   *** Empty and record drainage from drain on form provided and bring the form with you to your follow up appointment   Diet   You

## 2024-02-13 ENCOUNTER — ANESTHESIA EVENT (OUTPATIENT)
Dept: OPERATING ROOM | Age: 69
End: 2024-02-13

## 2024-02-14 ENCOUNTER — HOSPITAL ENCOUNTER (OUTPATIENT)
Age: 69
Setting detail: OUTPATIENT SURGERY
Discharge: HOME | End: 2024-02-14
Attending: PLASTIC SURGERY | Admitting: PLASTIC SURGERY

## 2024-02-14 ENCOUNTER — ANESTHESIA (OUTPATIENT)
Dept: OPERATING ROOM | Age: 69
End: 2024-02-14

## 2024-02-14 VITALS
BODY MASS INDEX: 32.78 KG/M2 | HEART RATE: 70 BPM | TEMPERATURE: 97.1 F | WEIGHT: 185 LBS | OXYGEN SATURATION: 92 % | SYSTOLIC BLOOD PRESSURE: 115 MMHG | DIASTOLIC BLOOD PRESSURE: 61 MMHG | RESPIRATION RATE: 16 BRPM | HEIGHT: 63 IN

## 2024-02-14 DIAGNOSIS — Z41.1 ENCOUNTER FOR COSMETIC SURGERY: Primary | ICD-10-CM

## 2024-02-14 PROCEDURE — 3700000000 HC ANESTHESIA ATTENDED CARE: Performed by: PLASTIC SURGERY

## 2024-02-14 PROCEDURE — 7100000001 HC PACU RECOVERY - ADDTL 15 MIN: Performed by: PLASTIC SURGERY

## 2024-02-14 PROCEDURE — 7100000011 HC PHASE II RECOVERY - ADDTL 15 MIN: Performed by: PLASTIC SURGERY

## 2024-02-14 PROCEDURE — 6360000002 HC RX W HCPCS

## 2024-02-14 PROCEDURE — 3700000001 HC ADD 15 MINUTES (ANESTHESIA): Performed by: PLASTIC SURGERY

## 2024-02-14 PROCEDURE — 2580000003 HC RX 258: Performed by: ANESTHESIOLOGY

## 2024-02-14 PROCEDURE — 7100000000 HC PACU RECOVERY - FIRST 15 MIN: Performed by: PLASTIC SURGERY

## 2024-02-14 PROCEDURE — 6360000002 HC RX W HCPCS: Performed by: SPECIALIST

## 2024-02-14 PROCEDURE — 7100000010 HC PHASE II RECOVERY - FIRST 15 MIN: Performed by: PLASTIC SURGERY

## 2024-02-14 PROCEDURE — 3600000012 HC SURGERY LEVEL 2 ADDTL 15MIN: Performed by: PLASTIC SURGERY

## 2024-02-14 PROCEDURE — 6370000000 HC RX 637 (ALT 250 FOR IP): Performed by: PLASTIC SURGERY

## 2024-02-14 PROCEDURE — 2500000003 HC RX 250 WO HCPCS: Performed by: PLASTIC SURGERY

## 2024-02-14 PROCEDURE — 2580000003 HC RX 258: Performed by: PLASTIC SURGERY

## 2024-02-14 PROCEDURE — 2709999900 HC NON-CHARGEABLE SUPPLY: Performed by: PLASTIC SURGERY

## 2024-02-14 PROCEDURE — 6370000000 HC RX 637 (ALT 250 FOR IP)

## 2024-02-14 PROCEDURE — 2500000003 HC RX 250 WO HCPCS: Performed by: SPECIALIST

## 2024-02-14 PROCEDURE — 3600000002 HC SURGERY LEVEL 2 BASE: Performed by: PLASTIC SURGERY

## 2024-02-14 RX ORDER — SODIUM CHLORIDE 9 MG/ML
INJECTION, SOLUTION INTRAVENOUS PRN
Status: DISCONTINUED | OUTPATIENT
Start: 2024-02-14 | End: 2024-02-14 | Stop reason: HOSPADM

## 2024-02-14 RX ORDER — CEFAZOLIN SODIUM 1 G/3ML
INJECTION, POWDER, FOR SOLUTION INTRAMUSCULAR; INTRAVENOUS PRN
Status: DISCONTINUED | OUTPATIENT
Start: 2024-02-14 | End: 2024-02-14 | Stop reason: SDUPTHER

## 2024-02-14 RX ORDER — MEPERIDINE HYDROCHLORIDE 50 MG/ML
12.5 INJECTION INTRAMUSCULAR; INTRAVENOUS; SUBCUTANEOUS EVERY 5 MIN PRN
Status: DISCONTINUED | OUTPATIENT
Start: 2024-02-14 | End: 2024-02-14 | Stop reason: HOSPADM

## 2024-02-14 RX ORDER — MIDAZOLAM HYDROCHLORIDE 2 MG/2ML
2 INJECTION, SOLUTION INTRAMUSCULAR; INTRAVENOUS
Status: DISCONTINUED | OUTPATIENT
Start: 2024-02-14 | End: 2024-02-14 | Stop reason: HOSPADM

## 2024-02-14 RX ORDER — SODIUM CHLORIDE, SODIUM LACTATE, POTASSIUM CHLORIDE, CALCIUM CHLORIDE 600; 310; 30; 20 MG/100ML; MG/100ML; MG/100ML; MG/100ML
INJECTION, SOLUTION INTRAVENOUS CONTINUOUS
Status: DISCONTINUED | OUTPATIENT
Start: 2024-02-14 | End: 2024-02-14 | Stop reason: HOSPADM

## 2024-02-14 RX ORDER — DIPHENHYDRAMINE HYDROCHLORIDE 50 MG/ML
12.5 INJECTION INTRAMUSCULAR; INTRAVENOUS
Status: DISCONTINUED | OUTPATIENT
Start: 2024-02-14 | End: 2024-02-14 | Stop reason: HOSPADM

## 2024-02-14 RX ORDER — LABETALOL HYDROCHLORIDE 5 MG/ML
10 INJECTION, SOLUTION INTRAVENOUS
Status: DISCONTINUED | OUTPATIENT
Start: 2024-02-14 | End: 2024-02-14 | Stop reason: HOSPADM

## 2024-02-14 RX ORDER — KETOROLAC TROMETHAMINE 30 MG/ML
INJECTION, SOLUTION INTRAMUSCULAR; INTRAVENOUS PRN
Status: DISCONTINUED | OUTPATIENT
Start: 2024-02-14 | End: 2024-02-14 | Stop reason: SDUPTHER

## 2024-02-14 RX ORDER — PHENYLEPHRINE HCL IN 0.9% NACL 1 MG/10 ML
SYRINGE (ML) INTRAVENOUS PRN
Status: DISCONTINUED | OUTPATIENT
Start: 2024-02-14 | End: 2024-02-14 | Stop reason: SDUPTHER

## 2024-02-14 RX ORDER — LIDOCAINE HYDROCHLORIDE 10 MG/ML
INJECTION, SOLUTION INFILTRATION; PERINEURAL PRN
Status: DISCONTINUED | OUTPATIENT
Start: 2024-02-14 | End: 2024-02-14 | Stop reason: SDUPTHER

## 2024-02-14 RX ORDER — OXYCODONE HYDROCHLORIDE AND ACETAMINOPHEN 5; 325 MG/1; MG/1
TABLET ORAL
Status: COMPLETED
Start: 2024-02-14 | End: 2024-02-14

## 2024-02-14 RX ORDER — METOCLOPRAMIDE HYDROCHLORIDE 5 MG/ML
10 INJECTION INTRAMUSCULAR; INTRAVENOUS
Status: DISCONTINUED | OUTPATIENT
Start: 2024-02-14 | End: 2024-02-14 | Stop reason: HOSPADM

## 2024-02-14 RX ORDER — FENTANYL CITRATE 50 UG/ML
INJECTION, SOLUTION INTRAMUSCULAR; INTRAVENOUS PRN
Status: DISCONTINUED | OUTPATIENT
Start: 2024-02-14 | End: 2024-02-14 | Stop reason: SDUPTHER

## 2024-02-14 RX ORDER — MORPHINE SULFATE 2 MG/ML
2 INJECTION, SOLUTION INTRAMUSCULAR; INTRAVENOUS EVERY 5 MIN PRN
Status: DISCONTINUED | OUTPATIENT
Start: 2024-02-14 | End: 2024-02-14 | Stop reason: HOSPADM

## 2024-02-14 RX ORDER — ONDANSETRON 2 MG/ML
INJECTION INTRAMUSCULAR; INTRAVENOUS PRN
Status: DISCONTINUED | OUTPATIENT
Start: 2024-02-14 | End: 2024-02-14 | Stop reason: SDUPTHER

## 2024-02-14 RX ORDER — LIDOCAINE HYDROCHLORIDE 10 MG/ML
1 INJECTION, SOLUTION EPIDURAL; INFILTRATION; INTRACAUDAL; PERINEURAL
Status: DISCONTINUED | OUTPATIENT
Start: 2024-02-14 | End: 2024-02-14 | Stop reason: HOSPADM

## 2024-02-14 RX ORDER — MIDAZOLAM HYDROCHLORIDE 1 MG/ML
INJECTION INTRAMUSCULAR; INTRAVENOUS PRN
Status: DISCONTINUED | OUTPATIENT
Start: 2024-02-14 | End: 2024-02-14 | Stop reason: SDUPTHER

## 2024-02-14 RX ORDER — CEPHALEXIN 500 MG/1
500 CAPSULE ORAL 3 TIMES DAILY
Qty: 15 CAPSULE | Refills: 0 | Status: SHIPPED | OUTPATIENT
Start: 2024-02-14 | End: 2024-02-19

## 2024-02-14 RX ORDER — SODIUM CHLORIDE 0.9 % (FLUSH) 0.9 %
5-40 SYRINGE (ML) INJECTION EVERY 12 HOURS SCHEDULED
Status: DISCONTINUED | OUTPATIENT
Start: 2024-02-14 | End: 2024-02-14 | Stop reason: HOSPADM

## 2024-02-14 RX ORDER — DEXAMETHASONE SODIUM PHOSPHATE 10 MG/ML
INJECTION, SOLUTION INTRAMUSCULAR; INTRAVENOUS PRN
Status: DISCONTINUED | OUTPATIENT
Start: 2024-02-14 | End: 2024-02-14 | Stop reason: SDUPTHER

## 2024-02-14 RX ORDER — PROPOFOL 10 MG/ML
INJECTION, EMULSION INTRAVENOUS PRN
Status: DISCONTINUED | OUTPATIENT
Start: 2024-02-14 | End: 2024-02-14 | Stop reason: SDUPTHER

## 2024-02-14 RX ORDER — OXYCODONE HYDROCHLORIDE AND ACETAMINOPHEN 5; 325 MG/1; MG/1
1 TABLET ORAL ONCE
Status: CANCELLED | OUTPATIENT
Start: 2024-02-14

## 2024-02-14 RX ORDER — CEFAZOLIN 2 G/1
INJECTION, POWDER, FOR SOLUTION INTRAMUSCULAR; INTRAVENOUS
Status: COMPLETED
Start: 2024-02-14 | End: 2024-02-14

## 2024-02-14 RX ORDER — SODIUM CHLORIDE 9 MG/ML
INJECTION, SOLUTION INTRAMUSCULAR; INTRAVENOUS; SUBCUTANEOUS
Status: DISCONTINUED
Start: 2024-02-14 | End: 2024-02-14 | Stop reason: HOSPADM

## 2024-02-14 RX ORDER — SODIUM CHLORIDE 0.9 % (FLUSH) 0.9 %
5-40 SYRINGE (ML) INJECTION PRN
Status: DISCONTINUED | OUTPATIENT
Start: 2024-02-14 | End: 2024-02-14 | Stop reason: HOSPADM

## 2024-02-14 RX ORDER — DEXMEDETOMIDINE HYDROCHLORIDE 100 UG/ML
INJECTION, SOLUTION INTRAVENOUS PRN
Status: DISCONTINUED | OUTPATIENT
Start: 2024-02-14 | End: 2024-02-14 | Stop reason: SDUPTHER

## 2024-02-14 RX ORDER — ROCURONIUM BROMIDE 10 MG/ML
INJECTION, SOLUTION INTRAVENOUS PRN
Status: DISCONTINUED | OUTPATIENT
Start: 2024-02-14 | End: 2024-02-14 | Stop reason: SDUPTHER

## 2024-02-14 RX ORDER — OXYCODONE HYDROCHLORIDE AND ACETAMINOPHEN 5; 325 MG/1; MG/1
1 TABLET ORAL EVERY 6 HOURS PRN
Qty: 28 TABLET | Refills: 0 | Status: SHIPPED | OUTPATIENT
Start: 2024-02-14 | End: 2024-02-21

## 2024-02-14 RX ORDER — ONDANSETRON 2 MG/ML
4 INJECTION INTRAMUSCULAR; INTRAVENOUS
Status: DISCONTINUED | OUTPATIENT
Start: 2024-02-14 | End: 2024-02-14 | Stop reason: HOSPADM

## 2024-02-14 RX ADMIN — Medication 100 MCG: at 09:11

## 2024-02-14 RX ADMIN — FENTANYL CITRATE 100 MCG: 50 INJECTION, SOLUTION INTRAMUSCULAR; INTRAVENOUS at 08:11

## 2024-02-14 RX ADMIN — SUGAMMADEX 200 MG: 100 INJECTION, SOLUTION INTRAVENOUS at 10:14

## 2024-02-14 RX ADMIN — HYDROMORPHONE HYDROCHLORIDE 0.5 MG: 1 INJECTION, SOLUTION INTRAMUSCULAR; INTRAVENOUS; SUBCUTANEOUS at 11:29

## 2024-02-14 RX ADMIN — DEXMEDETOMIDINE 4 MCG: 100 INJECTION, SOLUTION INTRAVENOUS at 09:00

## 2024-02-14 RX ADMIN — DEXAMETHASONE SODIUM PHOSPHATE 10 MG: 10 INJECTION, SOLUTION INTRAMUSCULAR; INTRAVENOUS at 08:11

## 2024-02-14 RX ADMIN — KETOROLAC TROMETHAMINE 30 MG: 30 INJECTION, SOLUTION INTRAMUSCULAR; INTRAVENOUS at 09:55

## 2024-02-14 RX ADMIN — CEFAZOLIN 2 G: 2 INJECTION, POWDER, FOR SOLUTION INTRAMUSCULAR; INTRAVENOUS at 08:20

## 2024-02-14 RX ADMIN — Medication 0.5 MG: at 11:29

## 2024-02-14 RX ADMIN — FENTANYL CITRATE 50 MCG: 50 INJECTION, SOLUTION INTRAMUSCULAR; INTRAVENOUS at 08:22

## 2024-02-14 RX ADMIN — MIDAZOLAM 2 MG: 1 INJECTION INTRAMUSCULAR; INTRAVENOUS at 08:06

## 2024-02-14 RX ADMIN — LIDOCAINE HYDROCHLORIDE 40 MG: 10 INJECTION, SOLUTION INFILTRATION; PERINEURAL at 08:11

## 2024-02-14 RX ADMIN — Medication 50 MCG: at 09:30

## 2024-02-14 RX ADMIN — DEXMEDETOMIDINE 8 MCG: 100 INJECTION, SOLUTION INTRAVENOUS at 08:20

## 2024-02-14 RX ADMIN — FENTANYL CITRATE 50 MCG: 50 INJECTION, SOLUTION INTRAMUSCULAR; INTRAVENOUS at 08:43

## 2024-02-14 RX ADMIN — DEXMEDETOMIDINE 8 MCG: 100 INJECTION, SOLUTION INTRAVENOUS at 08:40

## 2024-02-14 RX ADMIN — Medication 100 MCG: at 08:55

## 2024-02-14 RX ADMIN — DEXMEDETOMIDINE 8 MCG: 100 INJECTION, SOLUTION INTRAVENOUS at 08:11

## 2024-02-14 RX ADMIN — Medication 100 MCG: at 08:31

## 2024-02-14 RX ADMIN — ROCURONIUM BROMIDE 50 MG: 10 INJECTION, SOLUTION INTRAVENOUS at 08:11

## 2024-02-14 RX ADMIN — OXYCODONE HYDROCHLORIDE AND ACETAMINOPHEN 1 TABLET: 5; 325 TABLET ORAL at 11:04

## 2024-02-14 RX ADMIN — PROPOFOL 150 MG: 10 INJECTION, EMULSION INTRAVENOUS at 08:11

## 2024-02-14 RX ADMIN — ONDANSETRON 4 MG: 2 INJECTION INTRAMUSCULAR; INTRAVENOUS at 09:55

## 2024-02-14 RX ADMIN — SODIUM CHLORIDE, POTASSIUM CHLORIDE, SODIUM LACTATE AND CALCIUM CHLORIDE: 600; 310; 30; 20 INJECTION, SOLUTION INTRAVENOUS at 09:48

## 2024-02-14 RX ADMIN — SODIUM CHLORIDE, POTASSIUM CHLORIDE, SODIUM LACTATE AND CALCIUM CHLORIDE: 600; 310; 30; 20 INJECTION, SOLUTION INTRAVENOUS at 08:02

## 2024-02-14 ASSESSMENT — PAIN SCALES - GENERAL
PAINLEVEL_OUTOF10: 4
PAINLEVEL_OUTOF10: 5
PAINLEVEL_OUTOF10: 3
PAINLEVEL_OUTOF10: 4
PAINLEVEL_OUTOF10: 4
PAINLEVEL_OUTOF10: 7
PAINLEVEL_OUTOF10: 4
PAINLEVEL_OUTOF10: 4
PAINLEVEL_OUTOF10: 3
PAINLEVEL_OUTOF10: 7
PAINLEVEL_OUTOF10: 4

## 2024-02-14 ASSESSMENT — PAIN DESCRIPTION - LOCATION: LOCATION: ARM

## 2024-02-14 ASSESSMENT — PAIN - FUNCTIONAL ASSESSMENT: PAIN_FUNCTIONAL_ASSESSMENT: 0-10

## 2024-02-14 ASSESSMENT — PAIN DESCRIPTION - ORIENTATION: ORIENTATION: RIGHT;LEFT

## 2024-02-14 ASSESSMENT — PAIN DESCRIPTION - PAIN TYPE: TYPE: SURGICAL PAIN

## 2024-02-14 NOTE — H&P
MG/3ML SOPN inject 0.25 milligram subcutaneously weekly for 4 weeks then inje...  (REFER TO PRESCRIPTION NOTES). (Patient not taking: Reported on 1/16/2024)          No current facility-administered medications for this visit.         Social History               Socioeconomic History    Marital status: Single       Spouse name: Not on file    Number of children: Not on file    Years of education: Not on file    Highest education level: Not on file   Occupational History    Not on file   Tobacco Use    Smoking status: Never    Smokeless tobacco: Never   Vaping Use    Vaping Use: Never used   Substance and Sexual Activity    Alcohol use: Yes       Alcohol/week: 7.0 standard drinks of alcohol       Types: 7 Shots of liquor per week       Comment: 5 a week vodka and ginger ale    Drug use: Never    Sexual activity: Not on file   Other Topics Concern    Not on file   Social History Narrative    Not on file      Social Determinants of Health      Financial Resource Strain: Not on file   Food Insecurity: Not on file   Transportation Needs: Not on file   Physical Activity: Not on file   Stress: Not on file   Social Connections: Not on file   Intimate Partner Violence: Not on file   Housing Stability: Not on file         Family History         Family History   Problem Relation Age of Onset    Colon Cancer Brother      Colon Cancer Other           Review of systems is otherwise negative.  /68   Pulse 74   Resp 18   Ht 1.6 m (5' 3\")   Wt 81.6 kg (180 lb)   BMI 31.89 kg/m²         Objective:   Physical Exam  Vitals and nursing note reviewed.   Constitutional:       Appearance: She is well-developed.   HENT:      Head: Normocephalic and atraumatic.   Eyes:      Conjunctiva/sclera: Conjunctivae normal.      Pupils: Pupils are equal, round, and reactive to light.   Cardiovascular:      Rate and Rhythm: Normal rate.   Pulmonary:      Effort: Pulmonary effort is normal. No respiratory distress.      Breath sounds: No  wheezing.   Abdominal:      General: There is no distension.      Palpations: Abdomen is soft.   Musculoskeletal:         General: No tenderness. Normal range of motion.      Cervical back: Normal range of motion and neck supple.   Skin:     General: Skin is warm and dry.      Findings: No erythema or rash.   Neurological:      Mental Status: She is alert and oriented to person, place, and time.   Psychiatric:         Behavior: Behavior normal.         Thought Content: Thought content normal.      Patient had previous liposuction which has increased the laxity of the posterior skin of the upper arms bilaterally and she does not like this.  There is additional tissue on the lateral aspect of the upper arm that would benefit from some liposuction but I hesitate doing this at this time of a posterior brachioplasty.      Assessment:   Bilateral upper arm posterior laxity with hanging skin                Plan:   Plan: Bilateral Arm Lift, posterior incision     At a later date she may choose to do further upper arm liposuction after well healed            Incisions demonstrated  Activity and lifting restrictions reviewed      She will see Elma for pricing and scheduling  She will be given a patient care folder  She will be shown before and after pictures                      The patient was evaluated and examined with my nurse in the room at all times.  Portions of this note were transcribed using Dragon voice recognition technology and as such may reflect some variations in voice recognition.     COVID-19 precautions were taken throughout the entire office visit.  Patient was screened for COVID-19 symptoms and temperature was taken prior to coming back to the exam room.   A mask as well as gloves was worn throughout the entire office visit and distancing maintained as much as possible in between the physical examination periods.     Russ Luna MD  H & P reviewed. The Patient was examined and there are no

## 2024-02-14 NOTE — ANESTHESIA POSTPROCEDURE EVALUATION
Department of Anesthesiology  Postprocedure Note    Patient: Rachel Peña  MRN: [id number]  YOB: 1955  Date of evaluation: 2/14/2024    Procedure Summary       Date: 02/14/24 Room / Location: [address]    Anesthesia Start: 0806 Anesthesia Stop: 1033    Procedure: COSMETIC BILATERAL BRACHIOPLASTY (Bilateral: Arm Upper) Diagnosis:       Encounter for cosmetic surgery      (Encounter for cosmetic surgery [Z41.1])    Surgeons: Russ Luna MD Responsible Provider: Lima Harrison MD    Anesthesia Type: general ASA Status: 3            Anesthesia Type: No value filed.    Ildefonso Phase I: Ildefonso Score: 8    Ildefonso Phase II: Ildefonso Score: 9    Anesthesia Post Evaluation    Patient location during evaluation: PACU  Patient participation: complete - patient participated  Level of consciousness: awake  Airway patency: patent  Nausea & Vomiting: no nausea and no vomiting  Cardiovascular status: blood pressure returned to baseline  Respiratory status: acceptable  Hydration status: euvolemic  Pain management: adequate    No notable events documented.

## 2024-02-14 NOTE — ANESTHESIA PRE PROCEDURE
Department of Anesthesiology  Preprocedure Note       Name:  Rachel Peña   Age:  68 y.o.  :  1955                                          MRN:  [id number]         Date:  2024      Surgeon: Surgeon(s):  Russ Luna MD    Procedure: Procedure(s):  COSMETIC BILATERAL BRACHIOPLASTY    Medications prior to admission:   Prior to Admission medications    Medication Sig Start Date End Date Taking? Authorizing Provider   simvastatin (ZOCOR) 5 MG tablet Take 1 tablet by mouth nightly    Aria Ricardo MD   valsartan (DIOVAN) 320 MG tablet Take 1 tablet by mouth daily    Aria Ricardo MD   spironolactone (ALDACTONE) 50 MG tablet Take 1 tablet by mouth daily    Aria Ricardo MD   Nutritional Supplements (KETO PO) Take by mouth gummie    Aria Ricardo MD   Cholecalciferol (VITAMIN D3) 1000 units CAPS Take by mouth Vit D3 with B12    Aria Ricardo MD   furosemide (LASIX) 20 MG tablet Take 1 tablet by mouth every other day    Aria Ricardo MD   escitalopram (LEXAPRO) 10 MG tablet Take 1 tablet by mouth daily 23   Aria Ricardo MD   loratadine (CLARITIN) 10 MG tablet Take 1 tablet by mouth daily 23   Aria Ricardo MD   omeprazole (PRILOSEC) 20 MG delayed release capsule Take 1 capsule by mouth every morning (before breakfast) 23   Aria Ricardo MD   rosuvastatin (CRESTOR) 40 MG tablet Take 1 tablet by mouth daily 23   Aria Ricardo MD   carvedilol (COREG) 6.25 MG tablet Take 1 tablet by mouth 2 times daily (with meals)    Aria Ricardo MD       Current medications:    Current Facility-Administered Medications   Medication Dose Route Frequency Provider Last Rate Last Admin    lidocaine PF 1 % injection 1 mL  1 mL IntraDERmal Once PRN Yobany Escobar MD        lactated ringers IV soln infusion   IntraVENous Continuous Yobany Escobar MD        sodium chloride flush 0.9 % injection 5-40 mL  5-40 mL

## 2024-02-14 NOTE — OP NOTE
Operative Note      Patient: Rachel Peña  YOB: 1955  MRN: [id number]    Date of Procedure: 2/14/2024    Pre-Op Diagnosis Codes:     * Encounter for cosmetic surgery [Z41.1]    Post-Op Diagnosis: Same       Procedure(s):  COSMETIC BILATERAL BRACHIOPLASTY    Surgeon(s):  Russ Luna MD    Assistant:   First Assistant: Stanton Mirza RN    Anesthesia: General    Estimated Blood Loss (mL): less than 50     Complications: None    Specimens:   * No specimens in log *    Implants:  * No implants in log *      Drains: * No LDAs found *    Findings: Approximately 8 cm width in the midportion of the posterior arms bilaterally was excised.  There was significant scar within the soft tissue due to her previous liposuction.    Detailed Description of Procedure:   With the patient in the outpatient surgical area preoperative photographs were taken.  The patient taken to the operating room where general anesthesia was induced via oral endotracheal intubation.  The patient's bilateral upper extremities were prepped and draped in a sterile fashion.  Appropriate timeout was performed.  The bilateral brachioplasty through a posterior approach was marked symmetrically, bilaterally.  The central portion has had 4 cm excised on each side of the midline lying drawn from elbow to posterior axillary line.  This was injected with quarter percent lidocaine 1-400,000 epinephrine.    The material marked was excised as designed with dissection carried down through skin and subcutaneous tissue to the triceps fascia.  The medial and lateral flaps were elevated and as much of the posterior heavy material of skin and subcutaneous tissue was removed.  There was significant scar tissue noted along the posterior triceps fascia and within the tissue itself from the previous liposuction.  Bovie electrocautery hemostasis was performed.  Multilayer closure with 2-0 Monocryl was performed including intracuticular 2-0 Monocryl.

## 2025-03-27 DIAGNOSIS — M25.561 RIGHT KNEE PAIN, UNSPECIFIED CHRONICITY: Primary | ICD-10-CM

## 2025-03-31 ENCOUNTER — HOSPITAL ENCOUNTER (OUTPATIENT)
Age: 70
Discharge: HOME | End: 2025-04-02
Payer: MEDICARE

## 2025-03-31 ENCOUNTER — TELEPHONE (OUTPATIENT)
Age: 70
End: 2025-03-31

## 2025-03-31 PROCEDURE — 73562 X-RAY EXAM OF KNEE 3: CPT

## 2025-03-31 NOTE — TELEPHONE ENCOUNTER
Phoned patient in regards to getting x-ray taken prior to appointment with provider. Instructed to get x-ray within an hour prior to appointment at: [address]. -- The sign by the road states \"Lab and imaging services\". Order for x-ray has already been placed in system.

## 2025-04-01 ENCOUNTER — OFFICE VISIT (OUTPATIENT)
Age: 70
End: 2025-04-01

## 2025-04-01 VITALS — WEIGHT: 185 LBS | HEIGHT: 63 IN | BODY MASS INDEX: 32.78 KG/M2

## 2025-04-01 DIAGNOSIS — M17.11 ARTHRITIS OF RIGHT KNEE: Primary | ICD-10-CM

## 2025-04-01 RX ADMIN — METHYLPREDNISOLONE ACETATE 80 MG: 80 INJECTION, SUSPENSION INTRA-ARTICULAR; INTRALESIONAL; INTRAMUSCULAR; SOFT TISSUE at 08:50

## 2025-04-01 RX ADMIN — LIDOCAINE HYDROCHLORIDE 2 ML: 10 INJECTION, SOLUTION INFILTRATION; PERINEURAL at 08:50

## 2025-04-02 NOTE — PROGRESS NOTES
[facility] Orthopedics & Sports Medicine      [facility] ORTHOPAEDICS AND SPORTS MEDICINE  [facility]  [place] [address]  Dept: [phone]  Dept Fax: [phone]    Chief Compliant:  Chief Complaint   Patient presents with    Knee Pain     Right knee pain        History of Present Illness:  This is a 69 y.o. female who presents to the clinic today for evaluation / follow up of right knee pain.  She has had an injection in the past and it has worked for her.  She had a fall in July that seems to have really flared up her knee.  She has pain at times is 7 or 8 out of 10.  Still aching in nature.  She is here today for further evaluation.       Physical Exam:    On examination today the knee is tender palpation over the joint line there is some crepitus to range of motion no varus or valgus instability.  Good quadricep strength.  No effusion on the knee skin is normal intact,    Nursing note and vitals reviewed.     Labs and Imaging: X-rays of the right knee show some mild medial joint space narrowing.  The patellofemoral compartment looks to be significantly narrowed.  Small osteophyte present.    XR taken today:  No results found.      No orders of the defined types were placed in this encounter.      Assessment and Plan:  No diagnosis found.      This is a 69 y.o. female with right knee arthritis.  I discussed with her I do think this is a degenerative process as it has been going on for well over a year.  We discussed trying a steroid injection since this has worked well for her in the past.  She would like to go forward with this today.    Consent was obtained. Risks are pain, infection, joint stiffness, and increased blood glucose. The area was prepped with an alcohol swab. I then injected 80 mg of Depo Medrol and lidocaine into the right knee. A band-aid was placed over the injection site. The patient tolerated this well. .         Review

## 2025-04-03 RX ORDER — LIDOCAINE HYDROCHLORIDE 10 MG/ML
2 INJECTION, SOLUTION INFILTRATION; PERINEURAL ONCE
Status: COMPLETED | OUTPATIENT
Start: 2025-04-03 | End: 2025-04-01

## 2025-04-03 RX ORDER — METHYLPREDNISOLONE ACETATE 80 MG/ML
80 INJECTION, SUSPENSION INTRA-ARTICULAR; INTRALESIONAL; INTRAMUSCULAR; SOFT TISSUE ONCE
Status: COMPLETED | OUTPATIENT
Start: 2025-04-03 | End: 2025-04-01

## (undated) DEVICE — NEEDLE SPNL 20GA L3.5IN YEL HUB S STL REG WALL FIT STYL W/

## (undated) DEVICE — SUTURE MCRYL + SZ 2 0 L27IN ABSRB UD CP 1 L36MM 1 2 CIR REV MCP266H

## (undated) DEVICE — 3M™ PRECISE™ VISTA DISPOSABLE SKIN STAPLER 3995: Brand: 3M™ PRECISE™

## (undated) DEVICE — DRAPE,T,LAPARO,TRANS,STERILE: Brand: MEDLINE

## (undated) DEVICE — BANDAGE,GAUZE,BULKEE II,4.5"X4.1YD,STRL: Brand: MEDLINE

## (undated) DEVICE — SPONGE LAP W18XL18IN WHT COT 4 PLY FLD STRUNG RADPQ DISP ST 2 PER PACK

## (undated) DEVICE — BNDG,ELSTC,MATRIX,STRL,4"X5YD,LF,HOOK&LP: Brand: MEDLINE

## (undated) DEVICE — FORCEPS BX L240CM JAW DIA2.8MM L CAP W/ NDL MIC MESH TOOTH

## (undated) DEVICE — DRESSING,GAUZE,XEROFORM,CURAD,5"X9",ST: Brand: CURAD

## (undated) DEVICE — YANKAUER,FLEXIBLE HANDLE,REGLR CAPACITY: Brand: MEDLINE INDUSTRIES, INC.

## (undated) DEVICE — DRAPE,REIN 53X77,STERILE: Brand: MEDLINE

## (undated) DEVICE — DECANTER FLD 9IN ST BG FOR ASEP TRNSF OF FLD

## (undated) DEVICE — MHPB GEN MINOR PACK: Brand: MEDLINE INDUSTRIES, INC.

## (undated) DEVICE — GAUZE,SPONGE,4"X4",16PLY,STRL,LF,10/TRAY: Brand: MEDLINE

## (undated) DEVICE — SUTURE MCRYL SZ 3 0 L18IN ABSRB UD PS 2 3 8 CIR REV CUT NDL MCP497G

## (undated) DEVICE — GOWN POLY REINF SONT XLG: Brand: MEDLINE INDUSTRIES, INC.

## (undated) DEVICE — PAD,ABDOMINAL,5"X9",ST,LF,25/BX: Brand: MEDLINE INDUSTRIES, INC.

## (undated) DEVICE — MEDICINE CUP, GRADUATED, STER: Brand: MEDLINE

## (undated) DEVICE — TRAP SURG QUAD PARABOLA SLOT DSGN SFTY SCRN TRAPEASE

## (undated) DEVICE — COUNTER NDL 40 COUNT HLD 70 FOAM BLK ADH W/ MAG

## (undated) DEVICE — SYRINGE, LUER LOCK, 10ML: Brand: MEDLINE

## (undated) DEVICE — CO2 CANNULA,SUPERSOFT, ADLT,7'O2,7'CO2: Brand: MEDLINE

## (undated) DEVICE — PREMIUM DRY TRAY LF: Brand: MEDLINE INDUSTRIES, INC.

## (undated) DEVICE — ADAPTER TBNG LUER STUB 15 GA INTMED

## (undated) DEVICE — INTENDED FOR TISSUE SEPARATION, AND OTHER PROCEDURES THAT REQUIRE A SHARP SURGICAL BLADE TO PUNCTURE OR CUT.: Brand: BARD-PARKER ® CARBON RIB-BACK BLADES

## (undated) DEVICE — SOLUTION IRRIG 1000ML 0.9% SOD CHL USP POUR PLAS BTL

## (undated) DEVICE — GLOVE ORANGE PI 7 1/2   MSG9075

## (undated) DEVICE — TUBING, SUCTION, 1/4" X 12', STRAIGHT: Brand: MEDLINE

## (undated) DEVICE — SYRINGE MED 50ML LUERLOCK TIP